# Patient Record
Sex: MALE | Race: WHITE | Employment: OTHER | URBAN - METROPOLITAN AREA
[De-identification: names, ages, dates, MRNs, and addresses within clinical notes are randomized per-mention and may not be internally consistent; named-entity substitution may affect disease eponyms.]

---

## 2017-04-24 ENCOUNTER — OFFICE VISIT (OUTPATIENT)
Dept: GASTROENTEROLOGY | Age: 77
End: 2017-04-24
Payer: MEDICARE

## 2017-04-24 VITALS
HEART RATE: 71 BPM | HEIGHT: 72 IN | WEIGHT: 237 LBS | RESPIRATION RATE: 14 BRPM | DIASTOLIC BLOOD PRESSURE: 74 MMHG | TEMPERATURE: 98.1 F | BODY MASS INDEX: 32.1 KG/M2 | OXYGEN SATURATION: 98 % | SYSTOLIC BLOOD PRESSURE: 123 MMHG

## 2017-04-24 DIAGNOSIS — K51.219 ULCERATIVE PROCTITIS WITH COMPLICATION (HCC): ICD-10-CM

## 2017-04-24 DIAGNOSIS — K62.5 RECTAL BLEEDING: ICD-10-CM

## 2017-04-24 DIAGNOSIS — Z86.010 HISTORY OF COLON POLYPS: ICD-10-CM

## 2017-04-24 DIAGNOSIS — D36.9 TUBULAR ADENOMA: Primary | ICD-10-CM

## 2017-04-24 PROCEDURE — 99214 OFFICE O/P EST MOD 30 MIN: CPT | Performed by: INTERNAL MEDICINE

## 2017-04-24 ASSESSMENT — ENCOUNTER SYMPTOMS
CONSTIPATION: 0
ABDOMINAL DISTENTION: 0
ANAL BLEEDING: 0
ALLERGIC/IMMUNOLOGIC NEGATIVE: 1
VOMITING: 0
RECTAL PAIN: 0
BLOOD IN STOOL: 0
GASTROINTESTINAL NEGATIVE: 1
BACK PAIN: 1
ABDOMINAL PAIN: 0
DIARRHEA: 0
NAUSEA: 0
RESPIRATORY NEGATIVE: 1

## 2017-05-31 ENCOUNTER — TELEPHONE (OUTPATIENT)
Dept: UROLOGY | Age: 77
End: 2017-05-31

## 2017-08-16 ENCOUNTER — HOSPITAL ENCOUNTER (OUTPATIENT)
Age: 77
Discharge: HOME | End: 2017-08-16
Payer: MEDICARE

## 2017-08-16 LAB
ABSOLUTE EOS #: 0.22 K/UL (ref 0–0.4)
ABSOLUTE LYMPH #: 0.88 K/UL (ref 1–4.8)
ABSOLUTE MONO #: 0.88 K/UL (ref 0.1–1.3)
ALBUMIN SERPL-MCNC: 3.6 G/DL (ref 3.5–5.2)
ALBUMIN/GLOBULIN RATIO: ABNORMAL (ref 1–2.5)
ALP BLD-CCNC: 178 U/L (ref 40–129)
ALT SERPL-CCNC: 12 U/L (ref 5–41)
ANION GAP SERPL CALCULATED.3IONS-SCNC: 12 MMOL/L (ref 9–17)
AST SERPL-CCNC: 18 U/L
BASOPHILS # BLD: 1 %
BASOPHILS ABSOLUTE: 0.06 K/UL (ref 0–0.2)
BILIRUB SERPL-MCNC: 0.81 MG/DL (ref 0.3–1.2)
BILIRUBIN DIRECT: 0.21 MG/DL
BILIRUBIN, INDIRECT: 0.6 MG/DL (ref 0–1)
BUN BLDV-MCNC: 14 MG/DL (ref 8–23)
CHLORIDE BLD-SCNC: 101 MMOL/L (ref 98–107)
CHOLESTEROL/HDL RATIO: 2.8
CHOLESTEROL: 112 MG/DL
CO2: 27 MMOL/L (ref 20–31)
CREAT SERPL-MCNC: 0.85 MG/DL (ref 0.7–1.2)
DIFFERENTIAL TYPE: ABNORMAL
EOSINOPHILS RELATIVE PERCENT: 4 %
GFR AFRICAN AMERICAN: >60 ML/MIN
GFR NON-AFRICAN AMERICAN: >60 ML/MIN
GFR SERPL CREATININE-BSD FRML MDRD: NORMAL ML/MIN/{1.73_M2}
GFR SERPL CREATININE-BSD FRML MDRD: NORMAL ML/MIN/{1.73_M2}
GLOBULIN: ABNORMAL G/DL (ref 1.5–3.8)
GLUCOSE BLD-MCNC: 101 MG/DL (ref 70–99)
HCT VFR BLD CALC: 40.4 % (ref 41–53)
HDLC SERPL-MCNC: 40 MG/DL
HEMOGLOBIN: 13.5 G/DL (ref 13.5–17.5)
LDL CHOLESTEROL: 58 MG/DL (ref 0–130)
LYMPHOCYTES # BLD: 16 %
MCH RBC QN AUTO: 29.3 PG (ref 26–34)
MCHC RBC AUTO-ENTMCNC: 33.4 G/DL (ref 31–37)
MCV RBC AUTO: 87.5 FL (ref 80–100)
MONOCYTES # BLD: 16 %
MORPHOLOGY: NORMAL
PDW BLD-RTO: 14.5 % (ref 11.5–14.9)
PLATELET # BLD: 222 K/UL (ref 150–450)
PLATELET ESTIMATE: ABNORMAL
PMV BLD AUTO: 8.8 FL (ref 6–12)
POTASSIUM SERPL-SCNC: 4.2 MMOL/L (ref 3.7–5.3)
PROSTATE SPECIFIC ANTIGEN: 4.86 UG/L
RBC # BLD: 4.62 M/UL (ref 4.5–5.9)
RBC # BLD: ABNORMAL 10*6/UL
SEG NEUTROPHILS: 63 %
SEGMENTED NEUTROPHILS ABSOLUTE COUNT: 3.46 K/UL (ref 1.3–9.1)
SODIUM BLD-SCNC: 140 MMOL/L (ref 135–144)
THYROXINE, FREE: 1.25 NG/DL (ref 0.93–1.7)
TOTAL PROTEIN: 8 G/DL (ref 6.4–8.3)
TRIGL SERPL-MCNC: 71 MG/DL
TSH SERPL DL<=0.05 MIU/L-ACNC: 2.49 MIU/L (ref 0.3–5)
VITAMIN D 25-HYDROXY: 38.7 NG/ML (ref 30–100)
VLDLC SERPL CALC-MCNC: ABNORMAL MG/DL (ref 1–30)
WBC # BLD: 5.5 K/UL (ref 3.5–11)
WBC # BLD: ABNORMAL 10*3/UL

## 2017-08-16 PROCEDURE — 80051 ELECTROLYTE PANEL: CPT

## 2017-08-16 PROCEDURE — 84520 ASSAY OF UREA NITROGEN: CPT

## 2017-08-16 PROCEDURE — 82306 VITAMIN D 25 HYDROXY: CPT

## 2017-08-16 PROCEDURE — G0103 PSA SCREENING: HCPCS

## 2017-08-16 PROCEDURE — 85025 COMPLETE CBC W/AUTO DIFF WBC: CPT

## 2017-08-16 PROCEDURE — 84439 ASSAY OF FREE THYROXINE: CPT

## 2017-08-16 PROCEDURE — 82565 ASSAY OF CREATININE: CPT

## 2017-08-16 PROCEDURE — 80061 LIPID PANEL: CPT

## 2017-08-16 PROCEDURE — 84443 ASSAY THYROID STIM HORMONE: CPT

## 2017-08-16 PROCEDURE — 80076 HEPATIC FUNCTION PANEL: CPT

## 2017-08-16 PROCEDURE — 36415 COLL VENOUS BLD VENIPUNCTURE: CPT

## 2017-08-16 PROCEDURE — 82947 ASSAY GLUCOSE BLOOD QUANT: CPT

## 2018-08-28 ENCOUNTER — HOSPITAL ENCOUNTER (OUTPATIENT)
Age: 78
Discharge: HOME | End: 2018-08-28
Payer: MEDICARE

## 2018-08-28 LAB
-: ABNORMAL
ABSOLUTE BANDS #: 0.09 K/UL (ref 0–1)
ABSOLUTE EOS #: 0.05 K/UL (ref 0–0.4)
ABSOLUTE IMMATURE GRANULOCYTE: ABNORMAL K/UL (ref 0–0.3)
ABSOLUTE LYMPH #: 0.81 K/UL (ref 1–4.8)
ABSOLUTE MONO #: 0.59 K/UL (ref 0.1–1.3)
ALBUMIN SERPL-MCNC: 3.6 G/DL (ref 3.5–5.2)
ALBUMIN/GLOBULIN RATIO: ABNORMAL (ref 1–2.5)
ALP BLD-CCNC: 147 U/L (ref 40–129)
ALT SERPL-CCNC: 9 U/L (ref 5–41)
AMORPHOUS: ABNORMAL
ANION GAP SERPL CALCULATED.3IONS-SCNC: 10 MMOL/L (ref 9–17)
AST SERPL-CCNC: 14 U/L
BACTERIA: ABNORMAL
BANDS: 2 % (ref 0–10)
BASOPHILS # BLD: 1 % (ref 0–2)
BASOPHILS ABSOLUTE: 0.05 K/UL (ref 0–0.2)
BILIRUB SERPL-MCNC: 0.62 MG/DL (ref 0.3–1.2)
BILIRUBIN DIRECT: 0.19 MG/DL
BILIRUBIN URINE: NEGATIVE
BILIRUBIN, INDIRECT: 0.43 MG/DL (ref 0–1)
BUN BLDV-MCNC: 17 MG/DL (ref 8–23)
BUN/CREAT BLD: NORMAL (ref 9–20)
CALCIUM SERPL-MCNC: 9.2 MG/DL (ref 8.6–10.4)
CASTS UA: ABNORMAL /LPF
CHLORIDE BLD-SCNC: 103 MMOL/L (ref 98–107)
CHOLESTEROL/HDL RATIO: 2.8
CHOLESTEROL: 93 MG/DL
CO2: 26 MMOL/L (ref 20–31)
COLOR: ABNORMAL
COMMENT UA: ABNORMAL
CREAT SERPL-MCNC: 0.89 MG/DL (ref 0.7–1.2)
CRYSTALS, UA: ABNORMAL /HPF
DIFFERENTIAL TYPE: ABNORMAL
EOSINOPHILS RELATIVE PERCENT: 1 % (ref 0–4)
EPITHELIAL CELLS UA: ABNORMAL /HPF
GFR AFRICAN AMERICAN: >60 ML/MIN
GFR NON-AFRICAN AMERICAN: >60 ML/MIN
GFR SERPL CREATININE-BSD FRML MDRD: NORMAL ML/MIN/{1.73_M2}
GFR SERPL CREATININE-BSD FRML MDRD: NORMAL ML/MIN/{1.73_M2}
GLOBULIN: ABNORMAL G/DL (ref 1.5–3.8)
GLUCOSE BLD-MCNC: 94 MG/DL (ref 70–99)
GLUCOSE URINE: NEGATIVE
HCT VFR BLD CALC: 35.9 % (ref 41–53)
HDLC SERPL-MCNC: 33 MG/DL
HEMOGLOBIN: 11.6 G/DL (ref 13.5–17.5)
IMMATURE GRANULOCYTES: ABNORMAL %
KETONES, URINE: NEGATIVE
LDL CHOLESTEROL: 49 MG/DL (ref 0–130)
LEUKOCYTE ESTERASE, URINE: ABNORMAL
LYMPHOCYTES # BLD: 18 % (ref 24–44)
MCH RBC QN AUTO: 28.3 PG (ref 26–34)
MCHC RBC AUTO-ENTMCNC: 32.4 G/DL (ref 31–37)
MCV RBC AUTO: 87.1 FL (ref 80–100)
MONOCYTES # BLD: 13 % (ref 1–7)
MORPHOLOGY: NORMAL
MUCUS: ABNORMAL
NITRITE, URINE: NEGATIVE
NRBC AUTOMATED: ABNORMAL PER 100 WBC
OTHER OBSERVATIONS UA: ABNORMAL
PDW BLD-RTO: 14.8 % (ref 11.5–14.9)
PH UA: 5 (ref 5–8)
PLATELET # BLD: 211 K/UL (ref 150–450)
PLATELET ESTIMATE: ABNORMAL
PMV BLD AUTO: 8.7 FL (ref 6–12)
POTASSIUM SERPL-SCNC: 3.8 MMOL/L (ref 3.7–5.3)
PROSTATE SPECIFIC ANTIGEN: 2.69 UG/L
PROTEIN UA: NEGATIVE
RBC # BLD: 4.12 M/UL (ref 4.5–5.9)
RBC # BLD: ABNORMAL 10*6/UL
RBC UA: ABNORMAL /HPF
RENAL EPITHELIAL, UA: ABNORMAL /HPF
SEG NEUTROPHILS: 65 % (ref 36–66)
SEGMENTED NEUTROPHILS ABSOLUTE COUNT: 2.91 K/UL (ref 1.3–9.1)
SODIUM BLD-SCNC: 139 MMOL/L (ref 135–144)
SPECIFIC GRAVITY UA: 1.02 (ref 1–1.03)
THYROXINE, FREE: 1.29 NG/DL (ref 0.93–1.7)
TOTAL PROTEIN: 8 G/DL (ref 6.4–8.3)
TRICHOMONAS: ABNORMAL
TRIGL SERPL-MCNC: 57 MG/DL
TSH SERPL DL<=0.05 MIU/L-ACNC: 2.92 MIU/L (ref 0.3–5)
TURBIDITY: ABNORMAL
URINE HGB: ABNORMAL
UROBILINOGEN, URINE: NORMAL
VITAMIN D 25-HYDROXY: 33 NG/ML (ref 30–100)
VLDLC SERPL CALC-MCNC: ABNORMAL MG/DL (ref 1–30)
WBC # BLD: 4.5 K/UL (ref 3.5–11)
WBC # BLD: ABNORMAL 10*3/UL
WBC UA: ABNORMAL /HPF
YEAST: ABNORMAL

## 2018-08-28 PROCEDURE — 85025 COMPLETE CBC W/AUTO DIFF WBC: CPT

## 2018-08-28 PROCEDURE — 82306 VITAMIN D 25 HYDROXY: CPT

## 2018-08-28 PROCEDURE — 36415 COLL VENOUS BLD VENIPUNCTURE: CPT

## 2018-08-28 PROCEDURE — 87086 URINE CULTURE/COLONY COUNT: CPT

## 2018-08-28 PROCEDURE — 84443 ASSAY THYROID STIM HORMONE: CPT

## 2018-08-28 PROCEDURE — 80048 BASIC METABOLIC PNL TOTAL CA: CPT

## 2018-08-28 PROCEDURE — 84153 ASSAY OF PSA TOTAL: CPT

## 2018-08-28 PROCEDURE — 81001 URINALYSIS AUTO W/SCOPE: CPT

## 2018-08-28 PROCEDURE — 87077 CULTURE AEROBIC IDENTIFY: CPT

## 2018-08-28 PROCEDURE — 80061 LIPID PANEL: CPT

## 2018-08-28 PROCEDURE — 80076 HEPATIC FUNCTION PANEL: CPT

## 2018-08-28 PROCEDURE — 84439 ASSAY OF FREE THYROXINE: CPT

## 2018-08-28 PROCEDURE — 87186 SC STD MICRODIL/AGAR DIL: CPT

## 2018-08-30 LAB
CULTURE: ABNORMAL
Lab: ABNORMAL
ORGANISM: ABNORMAL
SPECIMEN DESCRIPTION: ABNORMAL
STATUS: ABNORMAL

## 2019-07-20 ENCOUNTER — HOSPITAL ENCOUNTER (OUTPATIENT)
Age: 79
Discharge: HOME | End: 2019-07-20
Payer: MEDICARE

## 2019-07-20 ENCOUNTER — HOSPITAL ENCOUNTER (OUTPATIENT)
Dept: NON INVASIVE DIAGNOSTICS | Age: 79
Discharge: HOME | End: 2019-07-20
Payer: MEDICARE

## 2019-07-20 ENCOUNTER — HOSPITAL ENCOUNTER (OUTPATIENT)
Dept: VASCULAR LAB | Age: 79
Discharge: HOME | End: 2019-07-20
Payer: MEDICARE

## 2019-07-20 LAB
-: ABNORMAL
ABSOLUTE EOS #: 0.08 K/UL (ref 0–0.4)
ABSOLUTE IMMATURE GRANULOCYTE: ABNORMAL K/UL (ref 0–0.3)
ABSOLUTE LYMPH #: 0.84 K/UL (ref 1–4.8)
ABSOLUTE MONO #: 0.88 K/UL (ref 0.1–1.3)
ALBUMIN SERPL-MCNC: 3.1 G/DL (ref 3.5–5.2)
ALBUMIN/GLOBULIN RATIO: ABNORMAL (ref 1–2.5)
ALP BLD-CCNC: 234 U/L (ref 40–129)
ALT SERPL-CCNC: 11 U/L (ref 5–41)
AMORPHOUS: ABNORMAL
ANION GAP SERPL CALCULATED.3IONS-SCNC: 10 MMOL/L (ref 9–17)
AST SERPL-CCNC: 18 U/L
BACTERIA: ABNORMAL
BASOPHILS # BLD: 0 % (ref 0–2)
BASOPHILS ABSOLUTE: 0 K/UL (ref 0–0.2)
BILIRUB SERPL-MCNC: 0.81 MG/DL (ref 0.3–1.2)
BILIRUBIN DIRECT: 0.38 MG/DL
BILIRUBIN URINE: NEGATIVE
BILIRUBIN, INDIRECT: 0.43 MG/DL (ref 0–1)
BNP INTERPRETATION: ABNORMAL
BUN BLDV-MCNC: 15 MG/DL (ref 8–23)
BUN/CREAT BLD: NORMAL (ref 9–20)
CALCIUM SERPL-MCNC: 9.2 MG/DL (ref 8.6–10.4)
CASTS UA: ABNORMAL /LPF
CHLORIDE BLD-SCNC: 102 MMOL/L (ref 98–107)
CHOLESTEROL/HDL RATIO: 3
CHOLESTEROL: 81 MG/DL
CO2: 26 MMOL/L (ref 20–31)
COLOR: YELLOW
COMMENT UA: ABNORMAL
CREAT SERPL-MCNC: 0.71 MG/DL (ref 0.7–1.2)
CRYSTALS, UA: ABNORMAL /HPF
CRYSTALS, UA: ABNORMAL /HPF
DIFFERENTIAL TYPE: ABNORMAL
EOSINOPHILS RELATIVE PERCENT: 2 % (ref 0–4)
EPITHELIAL CELLS UA: ABNORMAL /HPF
GFR AFRICAN AMERICAN: >60 ML/MIN
GFR NON-AFRICAN AMERICAN: >60 ML/MIN
GFR SERPL CREATININE-BSD FRML MDRD: NORMAL ML/MIN/{1.73_M2}
GFR SERPL CREATININE-BSD FRML MDRD: NORMAL ML/MIN/{1.73_M2}
GLOBULIN: ABNORMAL G/DL (ref 1.5–3.8)
GLUCOSE BLD-MCNC: 90 MG/DL (ref 70–99)
GLUCOSE URINE: NEGATIVE
HCT VFR BLD CALC: 29.9 % (ref 41–53)
HDLC SERPL-MCNC: 27 MG/DL
HEMOGLOBIN: 9.5 G/DL (ref 13.5–17.5)
IMMATURE GRANULOCYTES: ABNORMAL %
KETONES, URINE: NEGATIVE
LDL CHOLESTEROL: 46 MG/DL (ref 0–130)
LEUKOCYTE ESTERASE, URINE: ABNORMAL
LV EF: 62 %
LVEF MODALITY: NORMAL
LYMPHOCYTES # BLD: 20 % (ref 24–44)
MCH RBC QN AUTO: 25.7 PG (ref 26–34)
MCHC RBC AUTO-ENTMCNC: 31.8 G/DL (ref 31–37)
MCV RBC AUTO: 80.9 FL (ref 80–100)
MONOCYTES # BLD: 21 % (ref 1–7)
MORPHOLOGY: ABNORMAL
MUCUS: ABNORMAL
NITRITE, URINE: NEGATIVE
NRBC AUTOMATED: ABNORMAL PER 100 WBC
OTHER OBSERVATIONS UA: ABNORMAL
PDW BLD-RTO: 18 % (ref 11.5–14.9)
PH UA: 8.5 (ref 5–8)
PLATELET # BLD: 204 K/UL (ref 150–450)
PLATELET ESTIMATE: ABNORMAL
PMV BLD AUTO: 7.7 FL (ref 6–12)
POTASSIUM SERPL-SCNC: 3.9 MMOL/L (ref 3.7–5.3)
PRO-BNP: 1442 PG/ML
PROSTATE SPECIFIC ANTIGEN: 2.24 UG/L
PROTEIN UA: ABNORMAL
RBC # BLD: 3.7 M/UL (ref 4.5–5.9)
RBC # BLD: ABNORMAL 10*6/UL
RBC UA: ABNORMAL /HPF
RENAL EPITHELIAL, UA: ABNORMAL /HPF
SEG NEUTROPHILS: 57 % (ref 36–66)
SEGMENTED NEUTROPHILS ABSOLUTE COUNT: 2.4 K/UL (ref 1.3–9.1)
SODIUM BLD-SCNC: 138 MMOL/L (ref 135–144)
SPECIFIC GRAVITY UA: 1.02 (ref 1–1.03)
THYROXINE, FREE: 1.28 NG/DL (ref 0.93–1.7)
TOTAL PROTEIN: 9.1 G/DL (ref 6.4–8.3)
TRICHOMONAS: ABNORMAL
TRIGL SERPL-MCNC: 39 MG/DL
TSH SERPL DL<=0.05 MIU/L-ACNC: 2.41 MIU/L (ref 0.3–5)
TURBIDITY: ABNORMAL
URINE HGB: NEGATIVE
UROBILINOGEN, URINE: NORMAL
VITAMIN D 25-HYDROXY: 29.4 NG/ML (ref 30–100)
VLDLC SERPL CALC-MCNC: ABNORMAL MG/DL (ref 1–30)
WBC # BLD: 4.2 K/UL (ref 3.5–11)
WBC # BLD: ABNORMAL 10*3/UL
WBC UA: ABNORMAL /HPF
YEAST: ABNORMAL

## 2019-07-20 PROCEDURE — 93306 TTE W/DOPPLER COMPLETE: CPT

## 2019-07-20 PROCEDURE — 93880 EXTRACRANIAL BILAT STUDY: CPT

## 2019-07-20 PROCEDURE — G0103 PSA SCREENING: HCPCS

## 2019-07-20 PROCEDURE — 82306 VITAMIN D 25 HYDROXY: CPT

## 2019-07-20 PROCEDURE — 84443 ASSAY THYROID STIM HORMONE: CPT

## 2019-07-20 PROCEDURE — 80061 LIPID PANEL: CPT

## 2019-07-20 PROCEDURE — 85025 COMPLETE CBC W/AUTO DIFF WBC: CPT

## 2019-07-20 PROCEDURE — 83880 ASSAY OF NATRIURETIC PEPTIDE: CPT

## 2019-07-20 PROCEDURE — 80048 BASIC METABOLIC PNL TOTAL CA: CPT

## 2019-07-20 PROCEDURE — 80076 HEPATIC FUNCTION PANEL: CPT

## 2019-07-20 PROCEDURE — 36415 COLL VENOUS BLD VENIPUNCTURE: CPT

## 2019-07-20 PROCEDURE — 84439 ASSAY OF FREE THYROXINE: CPT

## 2019-07-20 PROCEDURE — 81001 URINALYSIS AUTO W/SCOPE: CPT

## 2019-08-12 ENCOUNTER — HOSPITAL ENCOUNTER (OUTPATIENT)
Age: 79
Discharge: HOME | End: 2019-08-12
Payer: MEDICARE

## 2019-08-12 LAB
ABSOLUTE EOS #: 0.14 K/UL (ref 0–0.4)
ABSOLUTE IMMATURE GRANULOCYTE: ABNORMAL K/UL (ref 0–0.3)
ABSOLUTE LYMPH #: 0.74 K/UL (ref 1–4.8)
ABSOLUTE MONO #: 0.64 K/UL (ref 0.1–1.3)
BASOPHILS # BLD: 1 % (ref 0–2)
BASOPHILS ABSOLUTE: 0.05 K/UL (ref 0–0.2)
DIFFERENTIAL TYPE: ABNORMAL
EOSINOPHILS RELATIVE PERCENT: 3 % (ref 0–4)
FERRITIN: 82 UG/L (ref 30–400)
FOLATE: 11.5 NG/ML
HCT VFR BLD CALC: 31.5 % (ref 41–53)
HEMOGLOBIN: 10.1 G/DL (ref 13.5–17.5)
IMMATURE GRANULOCYTES: ABNORMAL %
IRON SATURATION: 9 % (ref 20–55)
IRON: 21 UG/DL (ref 59–158)
LYMPHOCYTES # BLD: 16 % (ref 24–44)
MCH RBC QN AUTO: 25.8 PG (ref 26–34)
MCHC RBC AUTO-ENTMCNC: 31.9 G/DL (ref 31–37)
MCV RBC AUTO: 80.7 FL (ref 80–100)
MONOCYTES # BLD: 14 % (ref 1–7)
MORPHOLOGY: ABNORMAL
MORPHOLOGY: ABNORMAL
NRBC AUTOMATED: ABNORMAL PER 100 WBC
PDW BLD-RTO: 17.7 % (ref 11.5–14.9)
PLATELET # BLD: 241 K/UL (ref 150–450)
PLATELET ESTIMATE: ABNORMAL
PMV BLD AUTO: 7.9 FL (ref 6–12)
RBC # BLD: 3.91 M/UL (ref 4.5–5.9)
RBC # BLD: ABNORMAL 10*6/UL
SEG NEUTROPHILS: 66 % (ref 36–66)
SEGMENTED NEUTROPHILS ABSOLUTE COUNT: 3.03 K/UL (ref 1.3–9.1)
TOTAL IRON BINDING CAPACITY: 236 UG/DL (ref 250–450)
UNSATURATED IRON BINDING CAPACITY: 215 UG/DL (ref 112–347)
VITAMIN B-12: 368 PG/ML (ref 232–1245)
WBC # BLD: 4.6 K/UL (ref 3.5–11)
WBC # BLD: ABNORMAL 10*3/UL

## 2019-08-12 PROCEDURE — 85025 COMPLETE CBC W/AUTO DIFF WBC: CPT

## 2019-08-12 PROCEDURE — 83540 ASSAY OF IRON: CPT

## 2019-08-12 PROCEDURE — 82607 VITAMIN B-12: CPT

## 2019-08-12 PROCEDURE — 82728 ASSAY OF FERRITIN: CPT

## 2019-08-12 PROCEDURE — 83550 IRON BINDING TEST: CPT

## 2019-08-12 PROCEDURE — 82746 ASSAY OF FOLIC ACID SERUM: CPT

## 2019-08-12 PROCEDURE — 36415 COLL VENOUS BLD VENIPUNCTURE: CPT

## 2020-06-12 ENCOUNTER — APPOINTMENT (OUTPATIENT)
Dept: GENERAL RADIOLOGY | Age: 80
End: 2020-06-12
Payer: MEDICARE

## 2020-06-12 ENCOUNTER — HOSPITAL ENCOUNTER (EMERGENCY)
Age: 80
Discharge: HOME | End: 2020-06-12
Attending: EMERGENCY MEDICINE
Payer: MEDICARE

## 2020-06-12 VITALS
HEART RATE: 70 BPM | WEIGHT: 170 LBS | BODY MASS INDEX: 23.03 KG/M2 | HEIGHT: 72 IN | SYSTOLIC BLOOD PRESSURE: 95 MMHG | TEMPERATURE: 97.4 F | DIASTOLIC BLOOD PRESSURE: 45 MMHG | RESPIRATION RATE: 14 BRPM | OXYGEN SATURATION: 99 %

## 2020-06-12 LAB
ABSOLUTE BANDS #: 0.08 K/UL (ref 0–1)
ABSOLUTE EOS #: 0 K/UL (ref 0–0.4)
ABSOLUTE IMMATURE GRANULOCYTE: ABNORMAL K/UL (ref 0–0.3)
ABSOLUTE LYMPH #: 0.76 K/UL (ref 1–4.8)
ABSOLUTE MONO #: 0.72 K/UL (ref 0.1–1.3)
ALBUMIN SERPL-MCNC: 2.4 G/DL (ref 3.5–5.2)
ALBUMIN/GLOBULIN RATIO: ABNORMAL (ref 1–2.5)
ALP BLD-CCNC: 165 U/L (ref 40–129)
ALT SERPL-CCNC: 9 U/L (ref 5–41)
ANION GAP SERPL CALCULATED.3IONS-SCNC: 10 MMOL/L (ref 9–17)
AST SERPL-CCNC: 14 U/L
ATYPICAL LYMPHOCYTE ABSOLUTE COUNT: 0.08 K/UL
ATYPICAL LYMPHOCYTES: 2 %
BANDS: 2 % (ref 0–10)
BASOPHILS # BLD: 0 % (ref 0–2)
BASOPHILS ABSOLUTE: 0 K/UL (ref 0–0.2)
BILIRUB SERPL-MCNC: 0.45 MG/DL (ref 0.3–1.2)
BUN BLDV-MCNC: 20 MG/DL (ref 8–23)
BUN/CREAT BLD: ABNORMAL (ref 9–20)
CALCIUM SERPL-MCNC: 9.1 MG/DL (ref 8.6–10.4)
CHLORIDE BLD-SCNC: 100 MMOL/L (ref 98–107)
CO2: 28 MMOL/L (ref 20–31)
CREAT SERPL-MCNC: 0.61 MG/DL (ref 0.7–1.2)
DIFFERENTIAL TYPE: ABNORMAL
EOSINOPHILS RELATIVE PERCENT: 0 % (ref 0–4)
GFR AFRICAN AMERICAN: >60 ML/MIN
GFR NON-AFRICAN AMERICAN: >60 ML/MIN
GFR SERPL CREATININE-BSD FRML MDRD: ABNORMAL ML/MIN/{1.73_M2}
GFR SERPL CREATININE-BSD FRML MDRD: ABNORMAL ML/MIN/{1.73_M2}
GLUCOSE BLD-MCNC: 132 MG/DL (ref 70–99)
HCT VFR BLD CALC: 28 % (ref 41–53)
HEMOGLOBIN: 8.8 G/DL (ref 13.5–17.5)
IMMATURE GRANULOCYTES: ABNORMAL %
LYMPHOCYTES # BLD: 20 % (ref 24–44)
MCH RBC QN AUTO: 25 PG (ref 26–34)
MCHC RBC AUTO-ENTMCNC: 31.3 G/DL (ref 31–37)
MCV RBC AUTO: 79.9 FL (ref 80–100)
METAMYELOCYTES ABSOLUTE COUNT: 0.15 K/UL
METAMYELOCYTES: 4 %
MONOCYTES # BLD: 19 % (ref 1–7)
MORPHOLOGY: ABNORMAL
NRBC AUTOMATED: ABNORMAL PER 100 WBC
PDW BLD-RTO: 18.2 % (ref 11.5–14.9)
PLATELET # BLD: 205 K/UL (ref 150–450)
PLATELET ESTIMATE: ABNORMAL
PMV BLD AUTO: 7 FL (ref 6–12)
POTASSIUM SERPL-SCNC: 4.1 MMOL/L (ref 3.7–5.3)
RBC # BLD: 3.51 M/UL (ref 4.5–5.9)
RBC # BLD: ABNORMAL 10*6/UL
SEG NEUTROPHILS: 53 % (ref 36–66)
SEGMENTED NEUTROPHILS ABSOLUTE COUNT: 2.01 K/UL (ref 1.3–9.1)
SODIUM BLD-SCNC: 138 MMOL/L (ref 135–144)
TOTAL PROTEIN: 9.5 G/DL (ref 6.4–8.3)
WBC # BLD: 3.8 K/UL (ref 3.5–11)
WBC # BLD: ABNORMAL 10*3/UL

## 2020-06-12 PROCEDURE — 72072 X-RAY EXAM THORAC SPINE 3VWS: CPT

## 2020-06-12 PROCEDURE — 36415 COLL VENOUS BLD VENIPUNCTURE: CPT

## 2020-06-12 PROCEDURE — 85025 COMPLETE CBC W/AUTO DIFF WBC: CPT

## 2020-06-12 PROCEDURE — 99284 EMERGENCY DEPT VISIT MOD MDM: CPT

## 2020-06-12 PROCEDURE — 80053 COMPREHEN METABOLIC PANEL: CPT

## 2020-06-12 PROCEDURE — 72100 X-RAY EXAM L-S SPINE 2/3 VWS: CPT

## 2020-06-12 PROCEDURE — 73562 X-RAY EXAM OF KNEE 3: CPT

## 2020-06-12 RX ORDER — LORATADINE 10 MG/1
10 TABLET ORAL DAILY
COMMUNITY

## 2020-06-12 RX ORDER — FINASTERIDE 5 MG/1
5 TABLET, FILM COATED ORAL DAILY
COMMUNITY

## 2020-06-12 ASSESSMENT — ENCOUNTER SYMPTOMS
ABDOMINAL PAIN: 0
CHEST TIGHTNESS: 0
BLOOD IN STOOL: 0
FACIAL SWELLING: 0
SHORTNESS OF BREATH: 0
CONSTIPATION: 0
RHINORRHEA: 0
VOMITING: 0
TROUBLE SWALLOWING: 0
NAUSEA: 0
EYE DISCHARGE: 0
WHEEZING: 0
SORE THROAT: 0
BACK PAIN: 1
EYE PAIN: 0
SINUS PRESSURE: 0
COUGH: 0
COLOR CHANGE: 1
DIARRHEA: 0
EYE REDNESS: 0

## 2020-06-12 ASSESSMENT — PAIN SCALES - GENERAL: PAINLEVEL_OUTOF10: 5

## 2020-06-12 NOTE — ED PROVIDER NOTES
[address]  eMERGENCY dEPARTMENT eNCOUnter      Pt Name: Chary Torres  MRN: [id number]  [place] 1940  Date of evaluation: 6/12/20      CHIEF COMPLAINT       Chief Complaint   Patient presents with    Fall    Knee Pain    Back Pain         HISTORY OF PRESENT ILLNESS    Chary Torres is a 70 y.o. male who presents complaining of fall. Patient states he fell about a week ago in the garage. Patient is mostly wheelchair-bound but was up trying to get something out of the garage and his left leg did not move quite right handed up falling backwards and getting his leg twisted underneath him. Patient states since then he is been having moderate to severe pain in the middle of his back and also in his left knee. Patient denies hitting his head or losing consciousness. Family is also been concerning that he looks like his skin has been a little yellow compared to normal.  Patient does not have any liver history that he knows of. They spoke with the PCP who suggested to have any come in to be evaluated. REVIEW OF SYSTEMS       Review of Systems   Constitutional: Negative for activity change, appetite change, chills, diaphoresis and fever. HENT: Negative for congestion, ear pain, facial swelling, nosebleeds, rhinorrhea, sinus pressure, sore throat and trouble swallowing. Eyes: Negative for pain, discharge and redness. Respiratory: Negative for cough, chest tightness, shortness of breath and wheezing. Cardiovascular: Negative for chest pain, palpitations and leg swelling. Gastrointestinal: Negative for abdominal pain, blood in stool, constipation, diarrhea, nausea and vomiting. Genitourinary: Negative for difficulty urinating, dysuria, flank pain, frequency, genital sores and hematuria. Musculoskeletal: Positive for back pain. Negative for arthralgias, gait problem, joint swelling, myalgias and neck pain. Skin: Positive for color change. Negative for pallor, rash and wound.